# Patient Record
Sex: FEMALE | Race: WHITE | NOT HISPANIC OR LATINO | Employment: OTHER | URBAN - NONMETROPOLITAN AREA
[De-identification: names, ages, dates, MRNs, and addresses within clinical notes are randomized per-mention and may not be internally consistent; named-entity substitution may affect disease eponyms.]

---

## 2019-02-04 ENCOUNTER — HISTORICAL (OUTPATIENT)
Dept: ADMINISTRATIVE | Facility: HOSPITAL | Age: 59
End: 2019-02-04

## 2019-02-12 ENCOUNTER — HISTORICAL (OUTPATIENT)
Dept: ADMINISTRATIVE | Facility: HOSPITAL | Age: 59
End: 2019-02-12

## 2020-12-03 ENCOUNTER — HISTORICAL (OUTPATIENT)
Dept: ADMINISTRATIVE | Facility: HOSPITAL | Age: 60
End: 2020-12-03

## 2020-12-03 LAB
ALBUMIN SERPL-MCNC: 4.5 G/DL (ref 3.8–4.8)
ALBUMIN/GLOB SERPL: 1.7 {RATIO} (ref 1.2–2.2)
ALP SERPL-CCNC: 90 IU/L (ref 39–117)
ALT SERPL-CCNC: 16 IU/L (ref 0–32)
AST SERPL-CCNC: 20 IU/L (ref 0–40)
BASOPHILS # BLD AUTO: 0 X10E3/UL (ref 0–0.2)
BASOPHILS NFR BLD AUTO: 1 %
BILIRUB SERPL-MCNC: 0.6 MG/DL (ref 0–1.2)
BUN SERPL-MCNC: 15 MG/DL (ref 8–27)
CALCIUM SERPL-MCNC: 9.5 MG/DL (ref 8.7–10.3)
CHLORIDE SERPL-SCNC: 106 MMOL/L (ref 96–106)
CHOLEST SERPL-MCNC: 199 MG/DL (ref 100–199)
CHOLEST/HDLC SERPL: 4.2 RATIO (ref 0–4.4)
CO2 SERPL-SCNC: 22 MMOL/L (ref 20–29)
CREAT SERPL-MCNC: 0.83 MG/DL (ref 0.57–1)
CREAT/UREA NIT SERPL: 18 (ref 12–28)
EOSINOPHIL # BLD AUTO: 0.1 X10E3/UL (ref 0–0.4)
EOSINOPHIL NFR BLD AUTO: 2 %
ERYTHROCYTE [DISTWIDTH] IN BLOOD BY AUTOMATED COUNT: 13.2 % (ref 11.7–15.4)
GLOBULIN SER-MCNC: 2.6 G/DL (ref 1.5–4.5)
GLUCOSE SERPL-MCNC: 101 MG/DL (ref 65–99)
HCT VFR BLD AUTO: 44.9 % (ref 34–46.6)
HDLC SERPL-MCNC: 47 MG/DL
HGB BLD-MCNC: 14.3 G/DL (ref 11.1–15.9)
LDLC SERPL CALC-MCNC: 134 MG/DL (ref 0–99)
LYMPHOCYTES # BLD AUTO: 1.9 X10E3/UL (ref 0.7–3.1)
LYMPHOCYTES NFR BLD AUTO: 40 %
MCH RBC QN AUTO: 29.7 PG (ref 26.6–33)
MCHC RBC AUTO-ENTMCNC: 31.8 G/DL (ref 31.5–35.7)
MCV RBC AUTO: 93 FL (ref 79–97)
MONOCYTES # BLD AUTO: 0.4 X10E3/UL (ref 0.1–0.9)
MONOCYTES NFR BLD AUTO: 9 %
NEUTROPHILS # BLD AUTO: 2.3 X10E3/UL (ref 1.4–7)
NEUTROPHILS NFR BLD AUTO: 48 %
PLATELET # BLD AUTO: 278 X10E3/UL (ref 150–450)
POTASSIUM SERPL-SCNC: 4.1 MMOL/L (ref 3.5–5.2)
PROT SERPL-MCNC: 7.1 G/DL (ref 6–8.5)
RBC # BLD AUTO: 4.82 X10(6)/MCL (ref 3.77–5.28)
SODIUM SERPL-SCNC: 144 MMOL/L (ref 134–144)
TRIGL SERPL-MCNC: 97 MG/DL (ref 0–149)
TSH SERPL-ACNC: 0.81 MIU/ML (ref 0.45–4.5)
VLDLC SERPL CALC-MCNC: 18 MG/DL (ref 5–40)
WBC # SPEC AUTO: 4.8 X10E3/UL (ref 3.4–10.8)

## 2021-03-23 ENCOUNTER — HISTORICAL (OUTPATIENT)
Dept: ADMINISTRATIVE | Facility: HOSPITAL | Age: 61
End: 2021-03-23

## 2021-10-05 ENCOUNTER — HISTORICAL (OUTPATIENT)
Dept: ADMINISTRATIVE | Facility: HOSPITAL | Age: 61
End: 2021-10-05

## 2022-03-01 ENCOUNTER — TELEPHONE (OUTPATIENT)
Dept: GASTROENTEROLOGY | Facility: CLINIC | Age: 62
End: 2022-03-01

## 2022-03-01 RX ORDER — ESCITALOPRAM OXALATE 20 MG/1
20 TABLET ORAL DAILY
COMMUNITY
Start: 2022-02-25 | End: 2023-12-20 | Stop reason: SDUPTHER

## 2022-03-01 RX ORDER — DEXLANSOPRAZOLE 60 MG/1
1 CAPSULE, DELAYED RELEASE ORAL DAILY
COMMUNITY
Start: 2022-02-25 | End: 2023-01-30 | Stop reason: SDUPTHER

## 2022-03-01 RX ORDER — SIMVASTATIN 20 MG/1
20 TABLET, FILM COATED ORAL NIGHTLY
COMMUNITY
Start: 2022-02-25 | End: 2023-12-20 | Stop reason: SDUPTHER

## 2022-03-08 ENCOUNTER — TELEPHONE (OUTPATIENT)
Dept: GASTROENTEROLOGY | Facility: CLINIC | Age: 62
End: 2022-03-08

## 2022-03-08 NOTE — TELEPHONE ENCOUNTER
----- Message from Jennifer Duarte MA sent at 3/4/2022 11:41 AM CST -----    ----- Message -----  From: Zohreh More  Sent: 3/4/2022  11:21 AM CST  To: Andrea CHARLES Staff    Pt leaving cell number for call back - [phone]

## 2022-03-15 DIAGNOSIS — Z12.11 SCREENING FOR MALIGNANT NEOPLASM OF COLON: Primary | ICD-10-CM

## 2022-03-15 NOTE — TELEPHONE ENCOUNTER
----- Message from Elvia Ferrara sent at 3/15/2022  1:51 PM CDT -----  Patient need to speak to nurse regarding scheduling a colonoscopy. Call back number [phone]. Tks

## 2022-03-18 ENCOUNTER — TELEPHONE (OUTPATIENT)
Dept: GASTROENTEROLOGY | Facility: CLINIC | Age: 62
End: 2022-03-18

## 2022-03-18 VITALS — WEIGHT: 199 LBS | HEIGHT: 64 IN | BODY MASS INDEX: 33.97 KG/M2

## 2022-03-18 DIAGNOSIS — Z12.11 SCREENING FOR MALIGNANT NEOPLASM OF COLON: Primary | ICD-10-CM

## 2022-03-18 NOTE — TELEPHONE ENCOUNTER
"[facility]  [address] [place] [address]  Phone: [phone]  Fax: [phone]    History & Physical         Provider: Dr. Felicity Mendez    Patient Name: Gill MAYNARD (age):1960  61 y.o.           Gender: female   Phone: [phone]     Referring Physician: Rocael Garcia     Vital Signs:   5'4"  199 lb     Plan: Colonoscopy     Encounter Diagnosis   Name Primary?    Screening for malignant neoplasm of colon Yes           History:      Past Medical History:   Diagnosis Date    BMI 34.0-34.9,adult     Depression     GERD (gastroesophageal reflux disease)     Hypercholesterolemia     Renal stones       Past Surgical History:   Procedure Laterality Date    COLONOSCOPY      HYSTERECTOMY        Medication List with Changes/Refills   Current Medications    DEXLANSOPRAZOLE (DEXILANT) 60 MG CAPSULE    Take 1 capsule by mouth once daily.    ESCITALOPRAM OXALATE (LEXAPRO) 20 MG TABLET    Take 20 mg by mouth once daily.    SIMVASTATIN (ZOCOR) 20 MG TABLET    Take 20 mg by mouth nightly.      Review of patient's allergies indicates:   Allergen Reactions    Codeine       No family history on file.   Social History     Tobacco Use    Smoking status: Never Smoker    Smokeless tobacco: Never Used   Substance Use Topics    Alcohol use: Not Currently    Drug use: Never        Physical Examination:     General Appearance:___________________________  HEENT: _____________________________________  Abdomen:____________________________________  Heart:________________________________________  Lungs:_______________________________________  Extremities:___________________________________  Skin:_________________________________________  Endocrine:____________________________________  Genitourinary:_________________________________  Neurological:__________________________________      Patient has " been evaluated immediately prior to sedationa dn is medically cleared for endoscopy with IVCS as an ASA class: ______      Physician Signature: _________________________       Date: ________  Time: ________

## 2022-03-21 RX ORDER — SOD SULF/POT CHLORIDE/MAG SULF 1.479 G
12 TABLET ORAL DAILY
Qty: 24 TABLET | Refills: 0 | Status: SHIPPED | OUTPATIENT
Start: 2022-03-21 | End: 2023-08-18

## 2022-03-24 ENCOUNTER — TELEPHONE (OUTPATIENT)
Dept: GASTROENTEROLOGY | Facility: CLINIC | Age: 62
End: 2022-03-24
Payer: COMMERCIAL

## 2022-03-24 NOTE — TELEPHONE ENCOUNTER
----- Message from Naomi Palomino sent at 3/24/2022 11:06 AM CDT -----  Gill Serrano stated that her colonoscopy prep has a hold on it so the pharmacy is unable to fill it. Please give her a call back with an update regarding what's going on [phone]      [facility] [address] [facility] [address] [facility]  [address]  [place] [address]  Phone: [phone] Fax: [phone]

## 2022-04-10 ENCOUNTER — HISTORICAL (OUTPATIENT)
Dept: ADMINISTRATIVE | Facility: HOSPITAL | Age: 62
End: 2022-04-10
Payer: COMMERCIAL

## 2022-04-13 ENCOUNTER — OUTSIDE PLACE OF SERVICE (OUTPATIENT)
Dept: GASTROENTEROLOGY | Facility: CLINIC | Age: 62
End: 2022-04-13

## 2022-04-13 LAB — CRC RECOMMENDATION EXT: NORMAL

## 2022-04-13 PROCEDURE — 45385 COLONOSCOPY W/LESION REMOVAL: CPT | Mod: 33,,, | Performed by: INTERNAL MEDICINE

## 2022-04-13 PROCEDURE — 45385 PR COLONOSCOPY,REMV LESN,SNARE: ICD-10-PCS | Mod: 33,,, | Performed by: INTERNAL MEDICINE

## 2022-04-22 ENCOUNTER — TELEPHONE (OUTPATIENT)
Dept: GASTROENTEROLOGY | Facility: CLINIC | Age: 62
End: 2022-04-22

## 2022-04-22 NOTE — TELEPHONE ENCOUNTER
Results and recommendations discussed with patient, who voices understanding and agreement. They will contact us with any issues.  GUMEL, MARTIN

## 2022-04-24 VITALS
SYSTOLIC BLOOD PRESSURE: 138 MMHG | HEIGHT: 64 IN | DIASTOLIC BLOOD PRESSURE: 86 MMHG | WEIGHT: 188.94 LBS | OXYGEN SATURATION: 96 % | BODY MASS INDEX: 32.26 KG/M2

## 2022-05-03 NOTE — HISTORICAL [facility] CERNER
This is a historical note converted from Dayo. Formatting and pictures may have been removed.  Please reference Dayo for original formatting and attached multimedia. Chief Complaint  right foot bone spur pain  left ear pain  History of Present Illness  61-year-old female?presents with complaints of right?plantar foot pain near the heel.? The pain is worse first step in the morning or after prolonged sitting.? However, she constantly has pain.? She does attribute her symptoms to?wearing?crocs?frequently.? She does have a history of plantar fasciitis that responded well to an injection many years ago.? She also has a history of a heel spur?resection?many years ago?in the left foot.  ?  Additionally, she complains of some left ear pain.? She denies any otorrhea, nasal congestion, aural fullness.  Review of Systems  See HPI  Physical Exam  Vitals & Measurements  T:?36.6? ?C (Oral)? HR:?76(Peripheral)? BP:?150/94? SpO2:?98%?  HT:?166.50?cm? WT:?85.700?kg? BMI:?30.91?  General: Awake, alert, no acute distress  ENT:?Bilateral external auditory canals and tympanic membranes normal.? Mild tenderness to palpation over the left TMJ.  Right foot:?Tenderness to palpation over the plantar fascial insertion  ?  X-ray?right?calcaneus:?Moderate spurring of the calcaneus on the plantar aspect?as well as?at the Achilles  Assessment/Plan  1.?Plantar fasciitis of right foot ? M72.2  2.?Calcaneal spur, right ? M77.31  Refer to orthopedics/podiatry for injection and/or surgery (patient previously saw Dr. Kay in )  Supportive shoes and stretches  ?  3.?Otalgia, left ear ? H92.02  Suspect TMJ  Warm moist heat and NSAIDs  ?  4.?Encounter for immunization ? Z23  Flu vaccine today.   Problem List/Past Medical History  Ongoing  Gastroesophageal reflux disease  Hypercholesterolemia  Major depression, recurrent  Obesity  Renal stones  Procedure/Surgical History  Colonoscopy (2009)  foot surgery  hand surgery  Hysterectomy    Medications  Bi-est/Progesterone/DHEA/Testosterone, 4/30/15/1, 1mg/ml, See Instructions, 3 refills  Dexilant 60 mg oral delayed release capsule, 60 mg= 1 cap(s), Oral, Daily, 3 refills  escitalopram 20 mg oral tablet, 20 mg= 1 tab(s), Oral, Daily, 3 refills  simvastatin 20 mg oral tablet, 20 mg= 1 tab(s), Oral, Once a day (at bedtime), 3 refills  Allergies  codeine?(Confusion)  Social History  Abuse/Neglect  No, 10/05/2021  Alcohol  Never, 07/21/2021  Employment/School  Employed, Work/School description: [employer] 07/21/2021  Sexual  Sexually active: No. Gender Identity Identifies as female. Other contraceptive use: s/p hysteractomy. No, 07/21/2021  Substance Use  Never, 07/21/2021  Tobacco  Never (less than 100 in lifetime), N/A, 10/05/2021  Family History  Hypertension.: Unknown.  Primary malignant neoplasm of brain: Father.  Primary malignant neoplasm of breast: Other.  Primary malignant neoplasm of female genital organ: Mother.  Primary malignant neoplasm of prostate: Father.  Uterine cancer: Mother.

## 2022-06-16 ENCOUNTER — HISTORICAL (OUTPATIENT)
Dept: ADMINISTRATIVE | Facility: HOSPITAL | Age: 62
End: 2022-06-16

## 2023-01-19 PROBLEM — S43.432A GLENOID LABRAL TEAR, LEFT, INITIAL ENCOUNTER: Status: ACTIVE | Noted: 2023-01-19

## 2023-01-19 PROBLEM — M75.112 PARTIAL THICKNESS TEAR OF LEFT ROTATOR CUFF: Status: ACTIVE | Noted: 2023-01-19

## 2023-01-30 ENCOUNTER — TELEPHONE (OUTPATIENT)
Dept: FAMILY MEDICINE | Facility: CLINIC | Age: 63
End: 2023-01-30
Payer: COMMERCIAL

## 2023-01-30 DIAGNOSIS — K21.9 GASTROESOPHAGEAL REFLUX DISEASE, UNSPECIFIED WHETHER ESOPHAGITIS PRESENT: Primary | ICD-10-CM

## 2023-01-30 RX ORDER — DEXLANSOPRAZOLE 60 MG/1
1 CAPSULE, DELAYED RELEASE ORAL DAILY
Qty: 30 CAPSULE | Refills: 11 | Status: SHIPPED | OUTPATIENT
Start: 2023-01-30 | End: 2023-12-20 | Stop reason: SDUPTHER

## 2023-01-30 RX ORDER — DEXLANSOPRAZOLE 60 MG/1
1 CAPSULE, DELAYED RELEASE ORAL DAILY
Qty: 30 CAPSULE | Refills: 11 | Status: CANCELLED | OUTPATIENT
Start: 2023-01-30 | End: 2024-01-30

## 2023-01-30 NOTE — TELEPHONE ENCOUNTER
----- Message from Le Carreon MA sent at 1/30/2023 11:38 AM CST -----  Regarding: Refill  .Type:  RX Refill Request    Who Called: Gill    RX Name and Strength:Dexilant 60mg, QD     Is this a 30 day or 90 day RX:30    Preferred Pharmacy with phone number: [facility]     Best Call Back Number:[phone]

## 2023-02-01 ENCOUNTER — DOCUMENTATION ONLY (OUTPATIENT)
Dept: ADMINISTRATIVE | Facility: HOSPITAL | Age: 63
End: 2023-02-01
Payer: COMMERCIAL

## 2023-02-23 PROBLEM — S46.012A TRAUMATIC INCOMPLETE TEAR OF LEFT ROTATOR CUFF: Status: ACTIVE | Noted: 2023-02-23

## 2023-06-06 ENCOUNTER — TELEPHONE (OUTPATIENT)
Dept: OBSTETRICS AND GYNECOLOGY | Facility: CLINIC | Age: 63
End: 2023-06-06

## 2023-06-06 NOTE — TELEPHONE ENCOUNTER
----- Message from Brylee Guillory sent at 6/6/2023  4:24 PM CDT -----  Regarding: mammo order  Type:  Mammogram    Caller is requesting to schedule their annual mammogram appointment.    Order is not listed in EPIC.  Please enter orde    Name of Caller:na  Where would they like the mammogram performed?[facility]  Would the patient rather a call back or a response via [facility]? na  Best Call Back Number:na  Additional Information: na

## 2023-06-08 DIAGNOSIS — Z12.31 BREAST CANCER SCREENING BY MAMMOGRAM: Primary | ICD-10-CM

## 2023-06-19 ENCOUNTER — HOSPITAL ENCOUNTER (OUTPATIENT)
Dept: RADIOLOGY | Facility: HOSPITAL | Age: 63
Discharge: HOME | End: 2023-06-19
Attending: NURSE PRACTITIONER
Payer: COMMERCIAL

## 2023-06-19 VITALS — WEIGHT: 195 LBS | HEIGHT: 64 IN | BODY MASS INDEX: 33.29 KG/M2

## 2023-06-19 DIAGNOSIS — Z12.31 BREAST CANCER SCREENING BY MAMMOGRAM: ICD-10-CM

## 2023-06-19 PROCEDURE — 77067 SCR MAMMO BI INCL CAD: CPT | Mod: TC

## 2023-08-07 NOTE — PROGRESS NOTES
Chief Complaint: Annual exam    No chief complaint on file.      HPI:   62 y.o. F No obstetric history on file. presents for an annual gyn exam.        FmHx: negative for breast, uterine, ovarian, and colon cancers.     Labs / Significant Studies:       No family history on file.      Past Medical History:   Diagnosis Date    BMI 34.0-34.9,adult     Depression     GERD (gastroesophageal reflux disease)     Hypercholesterolemia     Personal history of colonic polyps     Renal stones      Past Surgical History:   Procedure Laterality Date    COLONOSCOPY  2009    COLONOSCOPY W/ BIOPSIES AND POLYPECTOMY  04/13/2022    HYSTERECTOMY         Current Outpatient Medications:     dexlansoprazole (DEXILANT) 60 mg capsule, Take 1 capsule (60 mg total) by mouth once daily., Disp: 30 capsule, Rfl: 11    EScitalopram oxalate (LEXAPRO) 20 MG tablet, Take 20 mg by mouth once daily., Disp: , Rfl:     simvastatin (ZOCOR) 20 MG tablet, Take 20 mg by mouth nightly., Disp: , Rfl:     sod sulf-pot chloride-mag sulf (SUTAB) 1.479-0.188- 0.225 gram tablet, Take 12 tablets by mouth once daily. Take according to package instructions with indicated amount of water. No breakfast day before test., Disp: 24 tablet, Rfl: 0    Review of patient's allergies indicates:   Allergen Reactions    Codeine        Social History     Tobacco Use    Smoking status: Never    Smokeless tobacco: Never   Substance Use Topics    Alcohol use: Not Currently    Drug use: Never       Review of Systems:  General/Constitutional: Chills denies. Fatigue/weakness denies. Fever denies. Night sweats denies. Hot flashes denies    Respiratory: Cough denies. Hemoptysis denies. SOB denies. Sputum production denies. Wheezing denies .   Cardiovascular: Chest pain denies . Dizziness denies. Palpitations denies. Swelling in hands/feet denies    Gastrointestinal: Abdominal pain denies. Blood in stool denies. Constipation denies. Diarrhea denies. Heartburn denies. Nausea denies.  Vomiting denies    Genitourinary: Incontinence denies. Blood in urine denies. Frequent urination denies. Painful urination denies. Urinary urgency denies. Nocturia denies    Gynecologic: Irregular menses denies. Heavy bleeding denies. Painful menses denies. Vaginal discharge denies. Vaginal odor denies. Vaginal itching denies. Vaginal lesion denies. Pelvic pain denies. Decreased libido denies. Vulvar lesion denies. Prolapse of genital organs denies. Painful intercourse denies. Postcoital bleeding denies    Psychiatric: Depression denies. Anxiety denies     Physical Exam:   There were no vitals filed for this visit.    There is no height or weight on file to calculate BMI.       Chaperone: present.     General appearance: healthy, well-nourished and well-developed     Psychiatric: Orientation to time, place and person. Normal mood and affect and active, alert     Skin: Appearance: no rashes or lesions.     Neck:   Neck: supple, FROM, trachea midline. and no masses   Thyroid: no enlargement or nodules and non-tender.       Cardiovascular:   Auscultation: RRR and no murmur.   Peripheral Vascular: no varicosities, LLE edema, RLE edema, calf tenderness, and palpable cord and pedal pulses intact.     Lungs:   Respiratory effort: no intercostal retractions or accessory muscle usage.   Auscultation: no wheezing, rales/crackles, or rhonchi and clear to auscultation.     Breast:   Inspection/Palpation: no tenderness, discrete/distinct masses, skin changes, or abnormal secretions. Nipple appearance normal.     Abdomen:   Auscultation/Inspection/Palpation: no hepatomegaly, splenomegaly, masses, tenderness or CVA tenderness and soft, non-distended bowel sounds preset.    Hernia: no palpable hernias.     Female Genitalia:    Vulva: no masses, tenderness or lesions    Bladder/Urethra: no urethral discharge or mass, normal meatus, bladder non-distended.    Vagina: no tenderness, erythema, cystocele, rectocele, abnormal vaginal  discharge or vesicle(s) or ulcers    Cervix: no discharge, no cervical lacerations noted or motion tenderness and grossly normal    Uterus: normal size and shape and midline, non-tender, and no uterine prolapse.    Adnexa/Parametria: no parametrial tenderness or mass, no adnexal tenderness or ovarian mass.     Lymph Nodes:   Palpation: non tender submandibular nodes, axillary nodes, or inguinal nodes.     Rectal Exam:   Rectum: normal perianal skin.       Assessment:     Patient Active Problem List   Diagnosis    Superior glenoid labrum lesion of left shoulder    Partial thickness tear of left rotator cuff    Traumatic incomplete tear of left rotator cuff       Health Maintenance Due   Topic Date Due    Hepatitis C Screening  Never done    COVID-19 Vaccine (1) Never done    HIV Screening  Never done    TETANUS VACCINE  Never done    Shingles Vaccine (1 of 2) Never done     Health Maintenance Topics with due status: Not Due       Topic Last Completion Date    Influenza Vaccine 10/05/2021    Colorectal Cancer Screening 04/13/2022    Hemoglobin A1c (Diabetic Prevention Screening) 12/15/2022    Lipid Panel 12/15/2022    Mammogram 06/19/2023         Plan:    There are no diagnoses linked to this encounter.

## 2023-08-18 ENCOUNTER — OFFICE VISIT (OUTPATIENT)
Dept: OBSTETRICS AND GYNECOLOGY | Facility: CLINIC | Age: 63
End: 2023-08-18
Payer: COMMERCIAL

## 2023-08-18 VITALS
SYSTOLIC BLOOD PRESSURE: 138 MMHG | DIASTOLIC BLOOD PRESSURE: 78 MMHG | BODY MASS INDEX: 32.06 KG/M2 | WEIGHT: 187.81 LBS | HEIGHT: 64 IN

## 2023-08-18 DIAGNOSIS — Z79.890 HORMONE REPLACEMENT THERAPY: ICD-10-CM

## 2023-08-18 DIAGNOSIS — Z01.419 NORMAL GYNECOLOGIC EXAMINATION: Primary | ICD-10-CM

## 2023-08-18 DIAGNOSIS — Z80.49 FAMILY HISTORY OF UTERINE CANCER: ICD-10-CM

## 2023-08-18 PROCEDURE — 1160F PR REVIEW ALL MEDS BY PRESCRIBER/CLIN PHARMACIST DOCUMENTED: ICD-10-PCS | Mod: CPTII,,, | Performed by: NURSE PRACTITIONER

## 2023-08-18 PROCEDURE — 3008F BODY MASS INDEX DOCD: CPT | Mod: CPTII,,, | Performed by: NURSE PRACTITIONER

## 2023-08-18 PROCEDURE — 99396 PR PREVENTIVE VISIT,EST,40-64: ICD-10-PCS | Mod: ,,, | Performed by: NURSE PRACTITIONER

## 2023-08-18 PROCEDURE — 3078F DIAST BP <80 MM HG: CPT | Mod: CPTII,,, | Performed by: NURSE PRACTITIONER

## 2023-08-18 PROCEDURE — 1159F PR MEDICATION LIST DOCUMENTED IN MEDICAL RECORD: ICD-10-PCS | Mod: CPTII,,, | Performed by: NURSE PRACTITIONER

## 2023-08-18 PROCEDURE — 1159F MED LIST DOCD IN RCRD: CPT | Mod: CPTII,,, | Performed by: NURSE PRACTITIONER

## 2023-08-18 PROCEDURE — 3008F PR BODY MASS INDEX (BMI) DOCUMENTED: ICD-10-PCS | Mod: CPTII,,, | Performed by: NURSE PRACTITIONER

## 2023-08-18 PROCEDURE — 3075F SYST BP GE 130 - 139MM HG: CPT | Mod: CPTII,,, | Performed by: NURSE PRACTITIONER

## 2023-08-18 PROCEDURE — 3078F PR MOST RECENT DIASTOLIC BLOOD PRESSURE < 80 MM HG: ICD-10-PCS | Mod: CPTII,,, | Performed by: NURSE PRACTITIONER

## 2023-08-18 PROCEDURE — 3075F PR MOST RECENT SYSTOLIC BLOOD PRESS GE 130-139MM HG: ICD-10-PCS | Mod: CPTII,,, | Performed by: NURSE PRACTITIONER

## 2023-08-18 PROCEDURE — 1160F RVW MEDS BY RX/DR IN RCRD: CPT | Mod: CPTII,,, | Performed by: NURSE PRACTITIONER

## 2023-08-18 PROCEDURE — 99396 PREV VISIT EST AGE 40-64: CPT | Mod: ,,, | Performed by: NURSE PRACTITIONER

## 2023-08-18 NOTE — PROGRESS NOTES
Chief Complaint:   Annual Exam     History of Present Illness:  Gill Serrano is a 63 y.o. year old  here for her annual exam status post JESSIE BSO. Denies any vaginal bleeding. Denies any health changes. Receives hormone pellets with Dr. Ewing, has upcoming follow up appt with [facility]. Content.     +family history of uterine cancer in mother at age 83. Negative for other gyn or colon cancers.      MENOPAUSAL:  Age at menarche: 15  Age at menopause: unsure   Hysterectomy:  Yes  Last pap: unsure  H/o abnl pap prior to current: no  Postcoital Bleeding: No  Sexually Active: no   Dyspareunia: No  H/o STI: No   HRT: Hormone Pellets @ Dr. Ewing   MM23, Benign   H/o abnl MMG: Hx of Dense Breast   Colonoscopy: 22, +tubular adenoma/hyperplastic polyp         Review of Systems:  General/Constitutional: Chills denies. Fatigue/weakness denies. Fever denies. Night sweats denies. Hot flashes denies    Respiratory: Cough denies. Hemoptysis denies. SOB denies. Sputum production denies. Wheezing denies .   Cardiovascular: Chest pain denies . Dizziness denies. Palpitations denies. Swelling in hands/feet denies    Gastrointestinal: Abdominal pain denies. Blood in stool denies. Constipation denies. Diarrhea denies. Heartburn denies. Nausea denies. Vomiting denies    Genitourinary: Incontinence denies. Blood in urine denies. Frequent urination denies. Painful urination denies. Urinary urgency denies. Nocturia denies    Gynecologic: Irregular menses denies. Heavy bleeding denies. Painful menses denies. Vaginal discharge denies. Vaginal odor denies. Vaginal itching denies. Vaginal lesion denies. Pelvic pain denies. Decreased libido denies. Vulvar lesion denies. Prolapse of genital organs denies. Painful intercourse denies. Postcoital bleeding denies    Psychiatric: Depression denies. Anxiety denies     OB History    Para Term  AB Living   2 2 2 0 0 2   SAB IAB Ectopic Multiple Live Births   0 0 0 0 2       # 1 - Date: [birth year]/25/86, Sex: Female, Weight: 3.544 kg (7 lb 13 oz), GA: 40w0d, Delivery: Vaginal, Spontaneous, Apgar1: None, Apgar5: None, Living: Living, Birth Comments: None    # 2 - Date: 07/21/90, Sex: Male, Weight: 3.345 kg (7 lb 6 oz), GA: None, Delivery: Vaginal, Spontaneous, Apgar1: None, Apgar5: None, Living: Living, Birth Comments: None      Past Medical History:   Diagnosis Date    BMI 34.0-34.9,adult     Depression     GERD (gastroesophageal reflux disease)     Hypercholesterolemia     Personal history of colonic polyps     Renal stones        Current Outpatient Medications:     dexlansoprazole (DEXILANT) 60 mg capsule, Take 1 capsule (60 mg total) by mouth once daily., Disp: 30 capsule, Rfl: 11    EScitalopram oxalate (LEXAPRO) 20 MG tablet, Take 20 mg by mouth once daily., Disp: , Rfl:     medical supply, miscellaneous (MISCELLANEOUS CREAMS TOP), Apply 1 mg topically. Bi-Est/Progesterone/DHEA/Testosterone, Disp: , Rfl:     medical supply, miscellaneous (MISCELLANEOUS MEDICAL SUPPLY MISC), by Misc.(Non-Drug; Combo Route) route. Hormone pellets Given By Dr. Ewing, Disp: , Rfl:     simvastatin (ZOCOR) 20 MG tablet, Take 20 mg by mouth nightly., Disp: , Rfl:     Review of patient's allergies indicates:   Allergen Reactions    Codeine      Past Surgical History:   Procedure Laterality Date    COLONOSCOPY  2009    COLONOSCOPY W/ BIOPSIES AND POLYPECTOMY  04/13/2022    FOOT SURGERY Left     Bone spur    HAND TENDON SURGERY Right     Trigger finger    TOTAL ABDOMINAL HYSTERECTOMY W/ BILATERAL SALPINGOOPHORECTOMY  1993     Family History   Problem Relation Age of Onset    Uterine cancer Mother     Breast cancer Neg Hx     Cervical cancer Neg Hx     Ovarian cancer Neg Hx     Colon cancer Neg Hx      Social History     Socioeconomic History    Marital status:     Number of children: 2   Occupational History    Occupation: retired   Tobacco Use    Smoking status: Never    Smokeless tobacco: Never  "  Substance and Sexual Activity    Alcohol use: Yes     Comment: social    Drug use: Never    Sexual activity: Not Currently     Partners: Male     Birth control/protection: See Surgical Hx   Social History Narrative    ** Merged History Encounter **            Physical Exam:  /78   Ht 5' 4" (1.626 m)   Wt 85.2 kg (187 lb 13.3 oz)   BMI 32.24 kg/m²     Chaperone: present.       General appearance: healthy, well-nourished and well-developed     Psychiatric: Orientation to time, place and person. Normal mood and affect and active, alert     Skin: Appearance: no rashes or lesions.     Neck:   Neck: supple, FROM, trachea midline. and no masses   Thyroid: no enlargement or nodules and non-tender.       Cardiovascular:   Auscultation: RRR and no murmur.   Peripheral Vascular: no varicosities, LLE edema, RLE edema, calf tenderness, and palpable cord and pedal pulses intact.     Lungs:   Respiratory effort: no intercostal retractions or accessory muscle usage.   Auscultation: no wheezing, rales/crackles, or rhonchi and clear to auscultation.     Breast:   Inspection/Palpation: no tenderness, discrete/distinct masses, skin changes, or abnormal secretions. Nipple appearance normal.     Abdomen:   Auscultation/Inspection/Palpation: no hepatomegaly, splenomegaly, masses, tenderness or CVA tenderness and soft, non-distended bowel sounds preset.    Hernia: no palpable hernias.     Female Genitalia:    Vulva: no masses, tenderness or lesions    Bladder/Urethra: no urethral discharge or mass, normal meatus, bladder non-distended.    Vagina: no tenderness, erythema, cystocele, rectocele, abnormal vaginal discharge or vesicle(s) or ulcers    Cuff intact, no masses, NT   Adnexa/Parametria: no parametrial tenderness or mass, no adnexal tenderness or ovarian mass.     Lymph Nodes:   Palpation: non tender submandibular nodes, axillary nodes, or inguinal nodes.     Rectal Exam:   Rectum: normal perianal skin. "       Assessment/Plan:  1. Normal gynecologic examination  No pap, s/p hyst, no h/o abnl pap   Advised patient if she notices any changes to her breasts, including a lump, mass, dimpling, discharge, rash or tenderness, to should contact us immediately   Healthy diet, exercise   MMG  Multivitamin   Seat belt   Sunscreen use   Safe sex/ STI education   Annual labs: w/ PCP, Dr Garcia  C-scope: 4/13/22, +tubular adenoma/hyperplastic polyp    2. Hormone replacement therapy  Educated  Discontinued the vaginal cream  Advised to follow up with [facility] for the hormone pellets    3. Family history of uterine cancer

## 2023-12-14 LAB
ALBUMIN SERPL-MCNC: 4.4 G/DL (ref 3.9–4.9)
ALBUMIN/GLOB SERPL: 1.8 {RATIO} (ref 1.2–2.2)
ALP SERPL-CCNC: 99 IU/L (ref 44–121)
ALT SERPL-CCNC: 11 IU/L (ref 0–32)
AST SERPL-CCNC: 16 IU/L (ref 0–40)
BASOPHILS # BLD AUTO: 0 X10E3/UL (ref 0–0.2)
BASOPHILS NFR BLD AUTO: 1 %
BILIRUB SERPL-MCNC: 0.4 MG/DL (ref 0–1.2)
BUN SERPL-MCNC: 11 MG/DL (ref 8–27)
BUN/CREAT SERPL: 13 (ref 12–28)
CALCIUM SERPL-MCNC: 9.4 MG/DL (ref 8.7–10.3)
CHLORIDE SERPL-SCNC: 105 MMOL/L (ref 96–106)
CHOLEST SERPL-MCNC: 159 MG/DL (ref 100–199)
CO2 SERPL-SCNC: 24 MMOL/L (ref 20–29)
CREAT SERPL-MCNC: 0.86 MG/DL (ref 0.57–1)
EOSINOPHIL # BLD AUTO: 0.1 X10E3/UL (ref 0–0.4)
EOSINOPHIL NFR BLD AUTO: 2 %
ERYTHROCYTE [DISTWIDTH] IN BLOOD BY AUTOMATED COUNT: 13.7 % (ref 11.7–15.4)
EST. GFR  (NO RACE VARIABLE): 75 ML/MIN/1.73
GLOBULIN SER CALC-MCNC: 2.4 G/DL (ref 1.5–4.5)
GLUCOSE SERPL-MCNC: 99 MG/DL (ref 70–99)
HBA1C MFR BLD: 5.5 % (ref 4.8–5.6)
HCT VFR BLD AUTO: 45.7 % (ref 34–46.6)
HDLC SERPL-MCNC: 38 MG/DL
HGB BLD-MCNC: 15 G/DL (ref 11.1–15.9)
IMM GRANULOCYTES NFR BLD AUTO: 0 %
LDLC SERPL CALC-MCNC: 97 MG/DL (ref 0–99)
LYMPHOCYTES # BLD AUTO: 2.7 X10E3/UL (ref 0.7–3.1)
LYMPHOCYTES NFR BLD AUTO: 50 %
MCH RBC QN AUTO: 29.6 PG (ref 26.6–33)
MCHC RBC AUTO-ENTMCNC: 32.8 G/DL (ref 31.5–35.7)
MCV RBC AUTO: 90 FL (ref 79–97)
MONOCYTES # BLD AUTO: 0.4 X10E3/UL (ref 0.1–0.9)
MONOCYTES NFR BLD AUTO: 8 %
NEUTROPHILS # BLD AUTO: 2.1 X10E3/UL (ref 1.4–7)
NEUTROPHILS NFR BLD AUTO: 39 %
PLATELET # BLD AUTO: 269 X10E3/UL (ref 150–450)
POTASSIUM SERPL-SCNC: 4.1 MMOL/L (ref 3.5–5.2)
PROT SERPL-MCNC: 6.8 G/DL (ref 6–8.5)
RBC # BLD AUTO: 5.06 X10E6/UL (ref 3.77–5.28)
SODIUM SERPL-SCNC: 145 MMOL/L (ref 134–144)
TRIGL SERPL-MCNC: 134 MG/DL (ref 0–149)
TSH SERPL DL<=0.005 MIU/L-ACNC: 1.52 UIU/ML (ref 0.45–4.5)
VLDLC SERPL CALC-MCNC: 24 MG/DL (ref 5–40)
WBC # BLD AUTO: 5.3 X10E3/UL (ref 3.4–10.8)

## 2023-12-20 ENCOUNTER — OFFICE VISIT (OUTPATIENT)
Dept: FAMILY MEDICINE | Facility: CLINIC | Age: 63
End: 2023-12-20
Payer: COMMERCIAL

## 2023-12-20 VITALS
DIASTOLIC BLOOD PRESSURE: 76 MMHG | BODY MASS INDEX: 32.27 KG/M2 | HEART RATE: 70 BPM | SYSTOLIC BLOOD PRESSURE: 122 MMHG | HEIGHT: 64 IN | TEMPERATURE: 97 F | WEIGHT: 189 LBS | OXYGEN SATURATION: 96 %

## 2023-12-20 DIAGNOSIS — F33.42 RECURRENT MAJOR DEPRESSIVE DISORDER, IN FULL REMISSION: ICD-10-CM

## 2023-12-20 DIAGNOSIS — Z00.01 ENCOUNTER FOR WELL ADULT EXAM WITH ABNORMAL FINDINGS: Primary | ICD-10-CM

## 2023-12-20 DIAGNOSIS — E78.00 HYPERCHOLESTEROLEMIA: ICD-10-CM

## 2023-12-20 DIAGNOSIS — K21.9 GERD WITHOUT ESOPHAGITIS: ICD-10-CM

## 2023-12-20 PROBLEM — N20.0 RENAL STONES: Status: ACTIVE | Noted: 2023-12-20

## 2023-12-20 PROBLEM — Z86.0100 PERSONAL HISTORY OF COLONIC POLYPS: Status: ACTIVE | Noted: 2023-12-20

## 2023-12-20 PROBLEM — Z86.010 PERSONAL HISTORY OF COLONIC POLYPS: Status: ACTIVE | Noted: 2023-12-20

## 2023-12-20 PROCEDURE — 1160F PR REVIEW ALL MEDS BY PRESCRIBER/CLIN PHARMACIST DOCUMENTED: ICD-10-PCS | Mod: CPTII,,, | Performed by: FAMILY MEDICINE

## 2023-12-20 PROCEDURE — 99396 PR PREVENTIVE VISIT,EST,40-64: ICD-10-PCS | Mod: ,,, | Performed by: FAMILY MEDICINE

## 2023-12-20 PROCEDURE — 1159F MED LIST DOCD IN RCRD: CPT | Mod: CPTII,,, | Performed by: FAMILY MEDICINE

## 2023-12-20 PROCEDURE — 3078F DIAST BP <80 MM HG: CPT | Mod: CPTII,,, | Performed by: FAMILY MEDICINE

## 2023-12-20 PROCEDURE — 1160F RVW MEDS BY RX/DR IN RCRD: CPT | Mod: CPTII,,, | Performed by: FAMILY MEDICINE

## 2023-12-20 PROCEDURE — 3078F PR MOST RECENT DIASTOLIC BLOOD PRESSURE < 80 MM HG: ICD-10-PCS | Mod: CPTII,,, | Performed by: FAMILY MEDICINE

## 2023-12-20 PROCEDURE — 3008F BODY MASS INDEX DOCD: CPT | Mod: CPTII,,, | Performed by: FAMILY MEDICINE

## 2023-12-20 PROCEDURE — 1159F PR MEDICATION LIST DOCUMENTED IN MEDICAL RECORD: ICD-10-PCS | Mod: CPTII,,, | Performed by: FAMILY MEDICINE

## 2023-12-20 PROCEDURE — 99396 PREV VISIT EST AGE 40-64: CPT | Mod: ,,, | Performed by: FAMILY MEDICINE

## 2023-12-20 PROCEDURE — 3008F PR BODY MASS INDEX (BMI) DOCUMENTED: ICD-10-PCS | Mod: CPTII,,, | Performed by: FAMILY MEDICINE

## 2023-12-20 PROCEDURE — 3044F PR MOST RECENT HEMOGLOBIN A1C LEVEL <7.0%: ICD-10-PCS | Mod: CPTII,,, | Performed by: FAMILY MEDICINE

## 2023-12-20 PROCEDURE — 3044F HG A1C LEVEL LT 7.0%: CPT | Mod: CPTII,,, | Performed by: FAMILY MEDICINE

## 2023-12-20 PROCEDURE — 3074F PR MOST RECENT SYSTOLIC BLOOD PRESSURE < 130 MM HG: ICD-10-PCS | Mod: CPTII,,, | Performed by: FAMILY MEDICINE

## 2023-12-20 PROCEDURE — 3074F SYST BP LT 130 MM HG: CPT | Mod: CPTII,,, | Performed by: FAMILY MEDICINE

## 2023-12-20 RX ORDER — ESCITALOPRAM OXALATE 20 MG/1
20 TABLET ORAL DAILY
Qty: 90 TABLET | Refills: 3 | Status: SHIPPED | OUTPATIENT
Start: 2023-12-20 | End: 2024-12-19

## 2023-12-20 RX ORDER — SIMVASTATIN 20 MG/1
20 TABLET, FILM COATED ORAL NIGHTLY
Qty: 90 TABLET | Refills: 3 | Status: SHIPPED | OUTPATIENT
Start: 2023-12-20 | End: 2024-12-19

## 2023-12-20 RX ORDER — DEXLANSOPRAZOLE 60 MG/1
1 CAPSULE, DELAYED RELEASE ORAL DAILY
Qty: 30 CAPSULE | Refills: 11 | Status: SHIPPED | OUTPATIENT
Start: 2023-12-20 | End: 2024-12-19

## 2023-12-20 NOTE — PROGRESS NOTES
"SUBJECTIVE:  HPI    Gill Serrano is a 63 y.o. female here for Annual Exam (Follow up -labs).     She is doing very well.  She has no current complaints or concerns.  She remains compliant with her medications.  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      Kellys allergies, medications, history, and problem list were updated as appropriate.    ROS:  Pertinent ROS as above, otherwise negative 12 point review of systems    OBJECTIVE:  Vital signs  Visit Vitals  /76 (BP Location: Right arm, Patient Position: Sitting)   Pulse 70   Temp 97.2 °F (36.2 °C) (Temporal)   Ht 5' 3.78" (1.62 m)   Wt 85.7 kg (189 lb)   SpO2 96%   BMI 32.67 kg/m²          PHYSICAL EXAM:  General: Awake, alert, no acute distress  Neck:  No bruits, no masses  Cardiovascular:  Regular rhythm.  Normal rate.  No murmurs.  Respiratory: Clear to auscultation bilaterally, normal effort  Extremities:  No cyanosis, no clubbing, no edema  Skin:  No rashes or appreciable lesions   Neuro:  Cranial nerves 2-12 are intact with usual testing.  Gait is normal.  Moves all 4 extremities equally and symmetrically.  Psychiatric:  Normal mood and affect.    Chemistry:  Lab Results   Component Value Date     (H) 12/12/2023    K 4.1 12/12/2023    BUN 11 12/12/2023    CREATININE 0.86 12/12/2023    EGFRNORACEVR 75 12/12/2023    GLUCOSE 101 (H) 12/03/2020    CALCIUM 9.4 12/12/2023    ALKPHOS 90 12/03/2020    AST 16 12/12/2023    ALT 11 12/12/2023    TSH 1.520 12/12/2023        Lab Results   Component Value Date    HGBA1C 5.5 12/12/2023        Hematology:  Lab Results   Component Value Date    WBC 5.3 12/12/2023    HGB 15.0 12/12/2023    MCV 90 12/12/2023     12/12/2023       Lipid Panel:  Lab Results   Component Value Date    CHOL 159 12/12/2023    HDL 38 (L) 12/12/2023    TRIG 134 12/12/2023    TOTALCHOLEST 4.2 12/03/2020        ASSESSMENT/PLAN:  1. Encounter for well adult exam with abnormal findings  Assessment & " Plan:  Counseled on continued healthy lifestyle choices, regular exercise    Orders:  -     Lipid Panel; Future; Expected date: 12/20/2024  -     CBC Auto Differential; Future; Expected date: 12/20/2024  -     Comprehensive Metabolic Panel; Future; Expected date: 12/20/2024  -     TSH; Future; Expected date: 12/20/2024    2. GERD without esophagitis  -     dexlansoprazole (DEXILANT) 60 mg capsule; Take 1 capsule (60 mg total) by mouth once daily.  Dispense: 30 capsule; Refill: 11    3. Hypercholesterolemia  -     Lipid Panel; Future; Expected date: 12/20/2024  -     simvastatin (ZOCOR) 20 MG tablet; Take 1 tablet (20 mg total) by mouth nightly.  Dispense: 90 tablet; Refill: 3    4. Recurrent major depressive disorder, in full remission  -     EScitalopram oxalate (LEXAPRO) 20 MG tablet; Take 1 tablet (20 mg total) by mouth once daily.  Dispense: 90 tablet; Refill: 3        Follow Up:  Follow up in about 1 year (around 12/20/2024) for Fasting labs, welcome to Medicare wellness.

## 2024-03-25 PROBLEM — Z00.01 ENCOUNTER FOR WELL ADULT EXAM WITH ABNORMAL FINDINGS: Status: RESOLVED | Noted: 2023-12-20 | Resolved: 2024-03-25

## 2024-06-13 DIAGNOSIS — Z12.31 SCREENING MAMMOGRAM FOR BREAST CANCER: Primary | ICD-10-CM

## 2024-06-20 ENCOUNTER — HOSPITAL ENCOUNTER (OUTPATIENT)
Dept: RADIOLOGY | Facility: HOSPITAL | Age: 64
Discharge: HOME | End: 2024-06-20
Attending: NURSE PRACTITIONER
Payer: COMMERCIAL

## 2024-06-20 DIAGNOSIS — Z12.31 SCREENING MAMMOGRAM FOR BREAST CANCER: ICD-10-CM

## 2024-06-20 PROCEDURE — 77067 SCR MAMMO BI INCL CAD: CPT | Mod: TC

## 2024-08-14 ENCOUNTER — TELEPHONE (OUTPATIENT)
Dept: FAMILY MEDICINE | Facility: CLINIC | Age: 64
End: 2024-08-14
Payer: COMMERCIAL

## 2024-08-14 DIAGNOSIS — J01.80 ACUTE NON-RECURRENT SINUSITIS OF OTHER SINUS: Primary | ICD-10-CM

## 2024-08-14 RX ORDER — CEFDINIR 300 MG/1
300 CAPSULE ORAL 2 TIMES DAILY
Qty: 20 CAPSULE | Refills: 0 | Status: SHIPPED | OUTPATIENT
Start: 2024-08-14 | End: 2024-08-24

## 2024-08-28 ENCOUNTER — OFFICE VISIT (OUTPATIENT)
Dept: OBSTETRICS AND GYNECOLOGY | Facility: CLINIC | Age: 64
End: 2024-08-28
Payer: COMMERCIAL

## 2024-08-28 VITALS
BODY MASS INDEX: 30.54 KG/M2 | SYSTOLIC BLOOD PRESSURE: 142 MMHG | TEMPERATURE: 97 F | DIASTOLIC BLOOD PRESSURE: 84 MMHG | WEIGHT: 172.38 LBS | HEIGHT: 63 IN

## 2024-08-28 DIAGNOSIS — Z01.411 ABNORMAL GYNECOLOGICAL EXAMINATION: Primary | ICD-10-CM

## 2024-08-28 DIAGNOSIS — N95.2 ATROPHIC VAGINITIS: ICD-10-CM

## 2024-08-28 PROCEDURE — G0101 CA SCREEN;PELVIC/BREAST EXAM: HCPCS | Mod: ,,, | Performed by: NURSE PRACTITIONER

## 2024-08-28 NOTE — PROGRESS NOTES
Chief Complaint: Annual exam    Chief Complaint   Patient presents with    Well Woman       HPI:   64 y.o. F  presents for an annual gyn exam.    S/p JESSIE, BSO with no complaints today.    PMH GERD, depression, hypercholesterolemia        Labs / Significant Studies:  Gyn History:    Menstrual History  Cycle: No  Menarche Age: 15 years  No Cycle Reason: (!) Surgical  Surgical Reason: hysterectomy    Menopause  Menopause Age: 1 years  Post Menopausal Bleeding: No  Hormone Replacement Therapy: Yes (Pellets)    Pap History  Last pap date:  (unknown)  History of Abnormal Pap: No  HPV Vaccine Completed: No (0/3)    [place]  Sexually Active: No  STI History: No  Contraception: No (s/p hyst)    Breast History  Last Breast Imaging Date: Yes  Date: 24 (benign)  History of Abnormal Breast Imaging : (!) Yes  Date: 19 (US,  benign)  History of Breast Biopsy: No    Family History   Problem Relation Name Age of Onset    Uterine cancer Mother @83, Diagnosed     Breast cancer Neg Hx      Cervical cancer Neg Hx      Ovarian cancer Neg Hx      Colon cancer Neg Hx           Past Medical History:   Diagnosis Date    GERD without esophagitis 2023    Hypercholesterolemia     Personal history of colonic polyps     Recurrent major depressive disorder, in full remission 2023    Renal stones      Past Surgical History:   Procedure Laterality Date    COLONOSCOPY      COLONOSCOPY W/ BIOPSIES AND POLYPECTOMY  2022    FOOT SURGERY Left     Bone spur    HAND TENDON SURGERY Right     Trigger finger    HYSTERECTOMY      TOTAL ABDOMINAL HYSTERECTOMY W/ BILATERAL SALPINGOOPHORECTOMY         Current Outpatient Medications:     dexlansoprazole (DEXILANT) 60 mg capsule, Take 1 capsule (60 mg total) by mouth once daily., Disp: 30 capsule, Rfl: 11    EScitalopram oxalate (LEXAPRO) 20 MG tablet, Take 1 tablet (20 mg total) by mouth once daily., Disp: 90 tablet, Rfl: 3    medical supply, miscellaneous  "([employer] MEDICAL SUPPLY MISC), by Misc.(Non-Drug; Combo Route) route. Hormone pellets Given By Dr. Ewing, Disp: , Rfl:     simvastatin (ZOCOR) 20 MG tablet, Take 1 tablet (20 mg total) by mouth nightly., Disp: 90 tablet, Rfl: 3    Review of patient's allergies indicates:   Allergen Reactions    Codeine        Social History     Tobacco Use    Smoking status: Never     Passive exposure: Never    Smokeless tobacco: Never   Substance Use Topics    Alcohol use: Yes     Comment: social    Drug use: Never       Review of Systems:  General/Constitutional: Chills denies. Fatigue/weakness denies. Fever denies. Night sweats denies. Hot flashes denies    Respiratory: Cough denies. Hemoptysis denies. SOB denies. Sputum production denies. Wheezing denies .   Cardiovascular: Chest pain denies . Dizziness denies. Palpitations denies. Swelling in hands/feet denies    Gastrointestinal: Abdominal pain denies. Blood in stool denies. Constipation denies. Diarrhea denies. Heartburn denies. Nausea denies. Vomiting denies    Genitourinary: Incontinence denies. Blood in urine denies. Frequent urination denies. Painful urination denies. Urinary urgency denies. Nocturia denies    Gynecologic: Irregular menses denies. Heavy bleeding denies. Painful menses denies. Vaginal discharge denies. Vaginal odor denies. Vaginal itching denies. Vaginal lesion denies. Pelvic pain denies. Decreased libido denies. Vulvar lesion denies. Prolapse of genital organs denies. Painful intercourse denies. Postcoital bleeding denies    Psychiatric: Depression denies. Anxiety denies     Physical Exam:   Vitals:    08/28/24 1411 08/28/24 1413   BP: (!) 140/88 (!) 142/84   BP Location: Left arm Left arm   Patient Position: Sitting Sitting   BP Method: Medium (Manual) Medium (Manual)   Temp: 97.3 °F (36.3 °C)    TempSrc: Temporal    Weight: 78.2 kg (172 lb 6.4 oz)    Height: 5' 3" (1.6 m)        Body mass index is 30.54 kg/m².       Chaperone: present.     General " appearance: healthy, well-nourished and well-developed     Psychiatric: Orientation to time, place and person. Normal mood and affect and active, alert     Skin: Appearance: no rashes or lesions.     Neck:   Neck: supple, FROM, trachea midline. and no masses   Thyroid: no enlargement or nodules and non-tender.       Cardiovascular:   Auscultation: RRR and no murmur.   Peripheral Vascular: no varicosities, LLE edema, RLE edema, calf tenderness, and palpable cord and pedal pulses intact.     Lungs:   Respiratory effort: no intercostal retractions or accessory muscle usage.   Auscultation: no wheezing, rales/crackles, or rhonchi and clear to auscultation.     Breast:   Inspection/Palpation: no tenderness, discrete/distinct masses, skin changes, or abnormal secretions. Nipple appearance normal.     Abdomen:   Auscultation/Inspection/Palpation: no hepatomegaly, splenomegaly, masses, tenderness or CVA tenderness and soft, non-distended bowel sounds preset.    Hernia: no palpable hernias.     Female Genitalia:    Vulva: no masses, tenderness or lesions    Bladder/Urethra: no urethral discharge or mass, normal meatus, bladder non-distended.    Vagina: no tenderness, erythema, cystocele, rectocele, abnormal vaginal discharge or vesicle(s) or ulcers, cuff intact, atrophy noted       Lymph Nodes:   Palpation: non tender submandibular nodes, axillary nodes, or inguinal nodes.     Rectal Exam:   Rectum: normal perianal skin.       Assessment:     Patient Active Problem List   Diagnosis    Superior glenoid labrum lesion of left shoulder    Partial thickness tear of left rotator cuff    Traumatic incomplete tear of left rotator cuff    GERD without esophagitis    Hypercholesterolemia    Recurrent major depressive disorder, in full remission    Renal stones    Personal history of colonic polyps       Health Maintenance Due   Topic Date Due    Hepatitis C Screening  Never done    HIV Screening  Never done    TETANUS VACCINE  Never  done    DEXA Scan  Never done    Shingles Vaccine (1 of 2) Never done    RSV Vaccine (Age 60+ and Pregnant patients) (1 - 1-dose 60+ series) Never done    COVID-19 Vaccine (1 - 2023-24 season) Never done    Pneumococcal Vaccines (Age 65+) (1 of 1 - PCV) Never done     Health Maintenance Topics with due status: Not Due       Topic Last Completion Date    Influenza Vaccine 10/05/2021    Colorectal Cancer Screening 04/13/2022    Hemoglobin A1c (Diabetic Prevention Screening) 12/12/2023    Lipid Panel 12/12/2023    High Dose Statin 12/20/2023    Mammogram 06/21/2024         Plan:    Gill was seen today for well woman.    Diagnoses and all orders for this visit:    Abnormal gynecological examination  Reviewed calcium needs, exercise, and prevention of osteoporosis.  Healthy diet  Annual MMG  Discussed breast self-awareness  Colonoscopy q 10 yrs  Reviewed normal menopause and menopausal symptoms  Strongly encouraged Shingles vaccination  RTC 1 yr   Atrophic vaginitis  - Educated     - Lubricants, coconut oil, baby oil

## 2024-09-09 ENCOUNTER — OFFICE VISIT (OUTPATIENT)
Dept: FAMILY MEDICINE | Facility: CLINIC | Age: 64
End: 2024-09-09
Payer: MEDICARE

## 2024-09-09 VITALS
OXYGEN SATURATION: 94 % | HEART RATE: 79 BPM | TEMPERATURE: 98 F | SYSTOLIC BLOOD PRESSURE: 138 MMHG | HEIGHT: 63 IN | BODY MASS INDEX: 30.48 KG/M2 | DIASTOLIC BLOOD PRESSURE: 86 MMHG | WEIGHT: 172 LBS

## 2024-09-09 DIAGNOSIS — I10 BENIGN ESSENTIAL HYPERTENSION: Primary | ICD-10-CM

## 2024-09-09 PROCEDURE — 93010 ELECTROCARDIOGRAM REPORT: CPT | Mod: ,,, | Performed by: FAMILY MEDICINE

## 2024-09-09 PROCEDURE — 99214 OFFICE O/P EST MOD 30 MIN: CPT | Mod: ,,, | Performed by: FAMILY MEDICINE

## 2024-09-09 RX ORDER — VALSARTAN AND HYDROCHLOROTHIAZIDE 80; 12.5 MG/1; MG/1
1 TABLET, FILM COATED ORAL DAILY
Qty: 90 TABLET | Refills: 3 | Status: SHIPPED | OUTPATIENT
Start: 2024-09-09 | End: 2025-09-09

## 2024-09-09 NOTE — PROGRESS NOTES
"SUBJECTIVE:  HPI    Gill Serrano is a 64 y.o. female here for BP.  She reports multiple blood pressure elevations over the last several weeks.  She went to an urgent care clinic with a sinus infection, she had her annual gynecology wellness exam, she went to the dentist.  At every appointment, her blood pressures were elevated so she began checking her blood pressures regularly at home with a home blood pressure cuff.  Her systolic blood pressures have consistently been greater than 150 and diastolic blood pressures have consistently been greater than 85.    She has had some occasional palpitations.  No syncope.  No angina.  No blurry vision or double vision.  She does admit to an episode of presyncope when she was trying to help her  get up from the floor after a fall.  It sounds like this occurred after a Valsalva maneuver.    She has a strong family history of hypertension with her mother and all of her sisters having a history of hypertension.    Kellys allergies, medications, history, and problem list were updated as appropriate.    ROS:  Pertinent ROS as above, otherwise negative    OBJECTIVE:  Vital signs  Visit Vitals  /86 (BP Location: Right arm, Patient Position: Sitting, BP Method: Medium (Manual))   Pulse 79   Temp 97.8 °F (36.6 °C) (Temporal)   Ht 5' 2.99" (1.6 m)   Wt 78 kg (172 lb)   SpO2 (!) 94%   BMI 30.48 kg/m²          PHYSICAL EXAM:  General: Awake, alert, no acute distress  Neck:  No bruits, no masses  Cardiovascular:  Regular rhythm.  Normal rate.  No murmurs.  Respiratory: Clear to auscultation bilaterally, normal effort  Extremities:  No cyanosis, no clubbing, no edema  Skin:  No rashes or appreciable lesions   Neuro:  Cranial nerves 2-12 are intact with usual testing.  Gait is normal.  Moves all 4 extremities equally and symmetrically.  Psychiatric:  Normal mood and affect.    EKG, my interpretation:  Normal sinus rhythm, normal intervals.  Nonspecific T-wave changes.  " Normal EKG.    Reviewed labs from December 2023      ASSESSMENT/PLAN:  1. Benign essential hypertension  Assessment & Plan:  Start valsartan hydrochlorothiazide 80/12.5 mg daily     Continue home blood pressure checks     Regular exercise     Return to clinic in 3 weeks, check BMP at visit    Orders:  -     POCT EKG 12-LEAD (NOT FOR [facility] USE)  -     valsartan-hydrochlorothiazide (DIOVAN-HCT) 80-12.5 mg per tablet; Take 1 tablet by mouth once daily.  Dispense: 90 tablet; Refill: 3

## 2024-09-09 NOTE — ASSESSMENT & PLAN NOTE
Start valsartan hydrochlorothiazide 80/12.5 mg daily     Continue home blood pressure checks     Regular exercise     Return to clinic in 3 weeks, check BMP at visit

## 2024-09-30 ENCOUNTER — TELEPHONE (OUTPATIENT)
Dept: FAMILY MEDICINE | Facility: CLINIC | Age: 64
End: 2024-09-30

## 2024-09-30 ENCOUNTER — OFFICE VISIT (OUTPATIENT)
Dept: FAMILY MEDICINE | Facility: CLINIC | Age: 64
End: 2024-09-30
Payer: MEDICARE

## 2024-09-30 VITALS
WEIGHT: 174 LBS | OXYGEN SATURATION: 96 % | HEART RATE: 66 BPM | DIASTOLIC BLOOD PRESSURE: 84 MMHG | TEMPERATURE: 98 F | BODY MASS INDEX: 30.83 KG/M2 | HEIGHT: 63 IN | SYSTOLIC BLOOD PRESSURE: 122 MMHG

## 2024-09-30 DIAGNOSIS — I10 BENIGN ESSENTIAL HYPERTENSION: Primary | ICD-10-CM

## 2024-09-30 LAB
ANION GAP SERPL CALC-SCNC: 6 MEQ/L (ref 2–13)
BUN SERPL-MCNC: 15 MG/DL (ref 7–20)
CALCIUM SERPL-MCNC: 10 MG/DL (ref 8.4–10.2)
CHLORIDE SERPL-SCNC: 106 MMOL/L (ref 98–110)
CO2 SERPL-SCNC: 30 MMOL/L (ref 21–32)
CREAT SERPL-MCNC: 0.92 MG/DL (ref 0.66–1.25)
CREAT/UREA NIT SERPL: 16 (ref 12–20)
GFR SERPLBLD CREATININE-BSD FMLA CKD-EPI: 69 ML/MIN/1.73/M2
GLUCOSE SERPL-MCNC: 102 MG/DL (ref 70–115)
POTASSIUM SERPL-SCNC: 3.5 MMOL/L (ref 3.5–5.1)
SODIUM SERPL-SCNC: 142 MMOL/L (ref 136–145)

## 2024-09-30 PROCEDURE — 80048 BASIC METABOLIC PNL TOTAL CA: CPT | Performed by: FAMILY MEDICINE

## 2024-09-30 PROCEDURE — 99213 OFFICE O/P EST LOW 20 MIN: CPT | Mod: ,,, | Performed by: FAMILY MEDICINE

## 2024-09-30 NOTE — ASSESSMENT & PLAN NOTE
Continue valsartan hydrochlorothiazide 80/12.5 mg daily     Check a basic metabolic panel today and call with results     Otherwise, return to clinic as scheduled with labs in December

## 2024-09-30 NOTE — PROGRESS NOTES
"SUBJECTIVE:  HPI    Gill Serrano is a 64 y.o. female here for 3 wk f/u on hypertension.  She is tolerating valsartan hydrochlorothiazide without any significant side effects.  No orthostatic dizziness.  No chest pain.  No headaches.  Her home blood pressures have been normal with systolic blood pressures less than 120 and diastolic blood pressures less than 80      Kellys allergies, medications, history, and problem list were updated as appropriate.    ROS:  Pertinent ROS as above, otherwise negative    OBJECTIVE:  Vital signs  Visit Vitals  /84 (BP Location: Left arm, Patient Position: Sitting)   Pulse 66   Temp 97.7 °F (36.5 °C) (Temporal)   Ht 5' 2.99" (1.6 m)   Wt 78.9 kg (174 lb)   SpO2 96%   BMI 30.83 kg/m²          PHYSICAL EXAM:  General: Awake, alert, no acute distress      ASSESSMENT/PLAN:  1. Benign essential hypertension  Assessment & Plan:  Continue valsartan hydrochlorothiazide 80/12.5 mg daily     Check a basic metabolic panel today and call with results     Otherwise, return to clinic as scheduled with labs in December    Orders:  -     Basic Metabolic Panel; Future; Expected date: 09/30/2024      "

## 2025-01-03 DIAGNOSIS — K21.9 GERD WITHOUT ESOPHAGITIS: ICD-10-CM

## 2025-01-03 RX ORDER — DEXLANSOPRAZOLE 60 MG/1
1 CAPSULE, DELAYED RELEASE ORAL DAILY
Qty: 30 CAPSULE | Refills: 3 | Status: SHIPPED | OUTPATIENT
Start: 2025-01-03

## 2025-01-03 RX ORDER — DEXLANSOPRAZOLE 60 MG/1
1 CAPSULE, DELAYED RELEASE ORAL
Qty: 30 CAPSULE | Refills: 11 | Status: CANCELLED | OUTPATIENT
Start: 2025-01-03

## 2025-01-27 PROCEDURE — 80053 COMPREHEN METABOLIC PANEL: CPT | Performed by: FAMILY MEDICINE

## 2025-01-27 PROCEDURE — 85025 COMPLETE CBC W/AUTO DIFF WBC: CPT | Performed by: FAMILY MEDICINE

## 2025-01-27 PROCEDURE — 80061 LIPID PANEL: CPT | Performed by: FAMILY MEDICINE

## 2025-01-27 PROCEDURE — 84443 ASSAY THYROID STIM HORMONE: CPT | Performed by: FAMILY MEDICINE

## 2025-01-30 DIAGNOSIS — E78.00 HYPERCHOLESTEROLEMIA: ICD-10-CM

## 2025-01-30 DIAGNOSIS — F33.42 RECURRENT MAJOR DEPRESSIVE DISORDER, IN FULL REMISSION: ICD-10-CM

## 2025-01-30 RX ORDER — SIMVASTATIN 20 MG/1
20 TABLET, FILM COATED ORAL NIGHTLY
Qty: 90 TABLET | Refills: 3 | Status: SHIPPED | OUTPATIENT
Start: 2025-01-30

## 2025-01-30 RX ORDER — ESCITALOPRAM OXALATE 20 MG/1
20 TABLET ORAL
Qty: 90 TABLET | Refills: 3 | Status: SHIPPED | OUTPATIENT
Start: 2025-01-30

## 2025-02-17 ENCOUNTER — OFFICE VISIT (OUTPATIENT)
Dept: FAMILY MEDICINE | Facility: CLINIC | Age: 65
End: 2025-02-17
Payer: MEDICARE

## 2025-02-17 VITALS
DIASTOLIC BLOOD PRESSURE: 68 MMHG | OXYGEN SATURATION: 98 % | HEIGHT: 63 IN | BODY MASS INDEX: 30.83 KG/M2 | TEMPERATURE: 97 F | HEART RATE: 65 BPM | SYSTOLIC BLOOD PRESSURE: 100 MMHG

## 2025-02-17 DIAGNOSIS — F33.42 RECURRENT MAJOR DEPRESSIVE DISORDER, IN FULL REMISSION: ICD-10-CM

## 2025-02-17 DIAGNOSIS — Z00.01 ENCOUNTER FOR WELL ADULT EXAM WITH ABNORMAL FINDINGS: Primary | ICD-10-CM

## 2025-02-17 DIAGNOSIS — K21.9 GERD WITHOUT ESOPHAGITIS: ICD-10-CM

## 2025-02-17 DIAGNOSIS — Z23 ENCOUNTER FOR IMMUNIZATION: ICD-10-CM

## 2025-02-17 DIAGNOSIS — I10 BENIGN ESSENTIAL HYPERTENSION: ICD-10-CM

## 2025-02-17 DIAGNOSIS — E78.00 HYPERCHOLESTEROLEMIA: ICD-10-CM

## 2025-02-17 RX ORDER — ROSUVASTATIN CALCIUM 10 MG/1
10 TABLET, COATED ORAL DAILY
Qty: 90 TABLET | Refills: 3 | Status: SHIPPED | OUTPATIENT
Start: 2025-02-17 | End: 2026-02-17

## 2025-02-17 NOTE — PROGRESS NOTES
Internal Medicine      Patient ID: [id number]     Chief Complaint: Medicare Annual Wellness     HPI:     Gill Serrano is a 64 y.o. female here today for a Medicare Annual Wellness visit and comprehensive Health Risk Assessment.     A separate E/M code has been provided to evaluate additional complaints that the patient would like addressed during the dedicated Medicare Wellness Exam.    No current problems, questions, concerns.  Her  has stage IV renal cell cancer.  Patient denies any chest pain, shortness on breath, abdominal pain.  She denies any depressed mood.    Health Maintenance   Topic Date Due    Hepatitis C Screening  Never done    HIV Screening  Never done    DEXA Scan  Never done    Pneumococcal Vaccines (Age 50+) (1 of 1 - PCV) Never done    RSV Vaccine (Age 60+ and Pregnant patients) (1 - Risk 60-74 years 1-dose series) Never done    Colorectal Cancer Screening  04/13/2025    Mammogram  06/21/2025    Hemoglobin A1c (Diabetic Prevention Screening)  12/12/2026    Lipid Panel  01/27/2030    TETANUS VACCINE  05/25/2032    Shingles Vaccine  Completed    Influenza Vaccine  Completed    COVID-19 Vaccine  Completed        Past Medical History:   Diagnosis Date    Benign essential hypertension 09/09/2024    GERD without esophagitis 12/20/2023    Hypercholesterolemia     Personal history of colonic polyps     Recurrent major depressive disorder, in full remission 12/20/2023    Renal stones         Past Surgical History:   Procedure Laterality Date    COLONOSCOPY  2009    COLONOSCOPY W/ BIOPSIES AND POLYPECTOMY  04/13/2022    FOOT SURGERY Left     Bone spur    HAND TENDON SURGERY Right     Trigger finger    HYSTERECTOMY  1996    TOTAL ABDOMINAL HYSTERECTOMY W/ BILATERAL SALPINGOOPHORECTOMY  1993        Social History     Socioeconomic History    Marital status:     Number of children: 2   Occupational History    Occupation: retired   Tobacco Use    Smoking status: Never     Passive exposure:  Never    Smokeless tobacco: Never   Substance and Sexual Activity    Alcohol use: Yes     Comment: social    Drug use: Never    Sexual activity: Not Currently     Partners: Male     Birth control/protection: See Surgical Hx   Social History Narrative    ** Merged History Encounter **             Family History   Problem Relation Name Age of Onset    Uterine cancer Mother @83, Diagnosed     Hypertension Mother @83, Diagnosed     Cancer Mother @83, Diagnosed     Cancer Father Marty Wynn     Hypertension Sister          Multiple sisters with hypertension    Breast cancer Neg Hx      Cervical cancer Neg Hx      Ovarian cancer Neg Hx      Colon cancer Neg Hx          Current Outpatient Medications   Medication Instructions    dexlansoprazole (DEXILANT) 60 mg, Oral, Daily    EScitalopram oxalate (LEXAPRO) 20 mg, Oral    medical supply, miscellaneous (MISCELLANEOUS MEDICAL SUPPLY MISC) by Misc.(Non-Drug; Combo Route) route. Hormone pellets Given By Dr. Ewing    rosuvastatin (CRESTOR) 10 mg, Oral, Daily    valsartan-hydrochlorothiazide (DIOVAN-HCT) 80-12.5 mg per tablet 1 tablet, Oral, Daily       Review of patient's allergies indicates:   Allergen Reactions    Codeine         Immunization History   Administered Date(s) Administered    COVID-19, MRNA, LN-S, PF (Pfizer) (Gray Cap) 05/25/2022    COVID-19, MRNA, LN-S, PF (Pfizer) (Purple Cap) 03/11/2021, 04/01/2021, 11/29/2021    COVID-19, mRNA, LNP-S, PF (Moderna) Ages 12+ 11/14/2023, 09/24/2024    Influenza - Quadrivalent - PF *Preferred* (6 months and older) 09/28/2016, 09/13/2017, 11/07/2018, 09/05/2019, 09/08/2020, 10/05/2021, 11/14/2023    Influenza - Trivalent - Afluria, Fluzone MDV 12/08/2005    Influenza - Trivalent - Fluad - Adjuvanted - PF (65 years and older 09/24/2024    Influenza - Trivalent - Fluarix, Flulaval, Fluzone, Afluria - PF 10/07/2015    Td (ADULT) 08/05/2004    Tdap 11/17/2018, 05/25/2022    Zoster Recombinant 09/05/2019, 11/10/2019        Patient  "Care Team:  Rocael Garcia MD as PCP - General (Family Medicine)  Meg Ewing MD as Obstetrician (Obstetrics and Gynecology)    Subjective:     Review of Systems    12 point review of systems conducted, negative except as stated in the history of present illness. See HPI for details.    Objective:     Visit Vitals  /68 (BP Location: Left arm, Patient Position: Sitting)   Pulse 65   Temp 96.8 °F (36 °C) (Temporal)   Ht 5' 2.99" (1.6 m)   SpO2 98%   BMI 30.83 kg/m²       Physical Exam  Vitals and nursing note reviewed.   Constitutional:       Appearance: Normal appearance.   HENT:      Head: Normocephalic and atraumatic.   Eyes:      Conjunctiva/sclera: Conjunctivae normal.      Pupils: Pupils are equal, round, and reactive to light.   Cardiovascular:      Rate and Rhythm: Normal rate and regular rhythm.      Heart sounds: Normal heart sounds.   Pulmonary:      Effort: Pulmonary effort is normal.      Breath sounds: Normal breath sounds.   Abdominal:      General: Abdomen is flat. Bowel sounds are normal.      Palpations: Abdomen is soft.   Musculoskeletal:      Cervical back: Normal range of motion.   Skin:     General: Skin is warm and dry.      Capillary Refill: Capillary refill takes less than 2 seconds.   Neurological:      General: No focal deficit present.      Mental Status: She is alert and oriented to person, place, and time.   Psychiatric:         Mood and Affect: Mood normal.         Thought Content: Thought content normal.         Judgment: Judgment normal.       EKG today, my interpretation:  Normal sinus rhythm, normal intervals and complexes with nonspecific T-wave changes especially in lead III and lead V1    Assessment:       ICD-10-CM ICD-9-CM   1. Encounter for well adult exam with abnormal findings  Z00.01 V70.0   2. Benign essential hypertension  I10 401.1   3. Hypercholesterolemia  E78.00 272.0   4. Recurrent major depressive disorder, in full remission  F33.42 296.36   5. " GERD without esophagitis  K21.9 530.81   6. Encounter for immunization  Z23 V03.89        Plan:     1. Encounter for well adult exam with abnormal findings    2. Benign essential hypertension  Assessment & Plan:  Continue valsartan hydrochlorothiazide 80/12.5 mg daily       Orders:  -     Cancel: Comprehensive Metabolic Panel; Future; Expected date: 08/17/2025  -     Cancel: CBC Auto Differential; Future; Expected date: 08/17/2025  -     Cancel: TSH; Future; Expected date: 08/17/2025  -     CBC Auto Differential; Future; Expected date: 02/17/2026  -     Comprehensive Metabolic Panel; Future; Expected date: 02/17/2026  -     TSH; Future; Expected date: 02/17/2026    3. Hypercholesterolemia  Overview:  Lab Results   Component Value Date    CHOL 197 01/27/2025    HDL 45 01/27/2025    LDLDIRECT 115.5 (H) 01/27/2025    TRIG 156 01/27/2025    TOTALCHOLEST 4.2 12/03/2020          Assessment & Plan:  Goal LDL less than 100    Change simvastatin to rosuvastatin 10 mg daily    Orders:  -     Cancel: Lipid Panel; Future; Expected date: 08/17/2025  -     Lipid Panel; Future; Expected date: 02/17/2026  -     rosuvastatin (CRESTOR) 10 MG tablet; Take 1 tablet (10 mg total) by mouth once daily.  Dispense: 90 tablet; Refill: 3    4. Recurrent major depressive disorder, in full remission  Assessment & Plan:  Controlled with Lexapro 20 mg daily      5. GERD without esophagitis  Assessment & Plan:  Controlled Dexilant 60 mg daily      6. Encounter for immunization  -     pneumoc 20-sandra conj-dip cr(PF) (PREVNAR-20 (PF)) injection Syrg 0.5 mL    Colorectal cancer screening due after April    The following assessments were completed and reviewed. See completed screening forms and assessments within the Encounter Summary.   [x] Health Risk Assessment   [x] CVD Risk Factors   [x] Obesity/Physical Activity -  Encouraged daily 30 minute physical activity x 5 days per week.   [x] Home Safety/Living Situation   [x] Alcohol Screen  [x]  Depression (PHQ) Screen   [x] Timed Get Up and Go   [x] Whisper Test  [x] Cognitive Function/Impairment Screen   [x] Nutrition Screening  [x] ADL Screen   [x] Opioid Screen:  [x] Patient does not have a prescription for opioids.   [] Patient has a prescription for opioids but is at low risk for abuse.   [x] Substance Abuse Screen:   [x] Patient does not use substances.   [] Patient screens positive for substance use disorder.   Advance Care Planning     Date: 02/17/2025  I discussed advanced care planning with the patient including how to pick a person who would make decisions for them if they were unable to make a decision for themselves, called a health care power of .  In addition, we discussed the types of decisions a patient may need to make such as use of life-sustaining treatments, including but not limited to the use of ventilators, artificial feeding tubes, CPR when faced with a life-limiting illness and how these should be recorded on a living will.     I have provided a 5 Wishes packet to the patient for completion.           Provided patient with a 5-10 year written screening schedule and personal prevention plan. Recommendations were developed using the USPSTF age appropriate recommendations. Education, counseling, and referrals were provided as needed. After Visit Summary printed and given to patient, which includes a list of additional screenings\tests needed.    Follow up in about 1 year (around 2/17/2026) for Medicare wellness, chronic health conditions, Fasting labs. In addition to their scheduled follow up, the patient has also been instructed to follow up on as needed basis.     No future appointments.     Rocael Garcia MD

## 2025-04-21 ENCOUNTER — TELEPHONE (OUTPATIENT)
Dept: GASTROENTEROLOGY | Facility: CLINIC | Age: 65
End: 2025-04-21
Payer: MEDICARE

## 2025-04-21 NOTE — TELEPHONE ENCOUNTER
----- Message from Izzy sent at 4/21/2025  4:27 PM CDT -----  Contact: QUINTIN OLIVER [[id number]]  ...Type:  Patient Requesting Appointment Who Called: QUINTIN OLIVER [[id number]]Symptoms?: colonoscopy When they would like to be seen?  Would the patient rather a call back or a response via [facility]?  callBest Call Back Number: [phone] Information:

## 2025-04-30 ENCOUNTER — OFFICE VISIT (OUTPATIENT)
Dept: FAMILY MEDICINE | Facility: CLINIC | Age: 65
End: 2025-04-30
Payer: MEDICARE

## 2025-04-30 VITALS
HEART RATE: 78 BPM | DIASTOLIC BLOOD PRESSURE: 60 MMHG | WEIGHT: 178.19 LBS | SYSTOLIC BLOOD PRESSURE: 112 MMHG | HEIGHT: 63 IN | OXYGEN SATURATION: 98 % | TEMPERATURE: 98 F | BODY MASS INDEX: 31.57 KG/M2

## 2025-04-30 DIAGNOSIS — J01.00 ACUTE NON-RECURRENT MAXILLARY SINUSITIS: ICD-10-CM

## 2025-04-30 DIAGNOSIS — J30.89 NON-SEASONAL ALLERGIC RHINITIS DUE TO OTHER ALLERGIC TRIGGER: Primary | ICD-10-CM

## 2025-04-30 PROCEDURE — 96372 THER/PROPH/DIAG INJ SC/IM: CPT | Mod: ,,, | Performed by: FAMILY MEDICINE

## 2025-04-30 PROCEDURE — 99214 OFFICE O/P EST MOD 30 MIN: CPT | Mod: ,,, | Performed by: FAMILY MEDICINE

## 2025-04-30 RX ORDER — CEFDINIR 300 MG/1
300 CAPSULE ORAL 2 TIMES DAILY
Qty: 20 CAPSULE | Refills: 0 | Status: SHIPPED | OUTPATIENT
Start: 2025-04-30 | End: 2025-05-10

## 2025-04-30 RX ORDER — BETAMETHASONE SODIUM PHOSPHATE AND BETAMETHASONE ACETATE 3; 3 MG/ML; MG/ML
9 INJECTION, SUSPENSION INTRA-ARTICULAR; INTRALESIONAL; INTRAMUSCULAR; SOFT TISSUE
Status: COMPLETED | OUTPATIENT
Start: 2025-04-30 | End: 2025-04-30

## 2025-04-30 RX ORDER — SPIRONOLACTONE 50 MG/1
50 TABLET, FILM COATED ORAL DAILY
COMMUNITY
Start: 2025-02-21

## 2025-04-30 RX ADMIN — BETAMETHASONE SODIUM PHOSPHATE AND BETAMETHASONE ACETATE 9 MG: 3; 3 INJECTION, SUSPENSION INTRA-ARTICULAR; INTRALESIONAL; INTRAMUSCULAR; SOFT TISSUE at 01:04

## 2025-04-30 NOTE — PROGRESS NOTES
"SUBJECTIVE:  HPI    Gill Serrano is a 64 y.o. female here for Sinus Problem (Sinus infection, sore throat, dark mucus, ear pain. X2days. No enerergy.).   History of Present Illness    CHIEF COMPLAINT:  Patient presents today with sinus symptoms.    HISTORY OF PRESENT ILLNESS:  She reports sinus symptoms that started gradually after cleaning her house without a mask on Good Friday. The symptoms worsened two days ago. She experiences left-sided congestion with associated ear pain, green mucus in the mornings when clearing her throat, and episodes of chills followed by sweating suggesting fever. She initially experienced dizziness when putting her head down. She also reports recent onset of allergy symptoms including itchy nose and watery eyes. She denies cough.    MEDICATIONS:  She reports using Jaycee-Drummonds Plus day and night formulations with partial symptomatic relief.            Kellys allergies, medications, history, and problem list were updated as appropriate.    ROS:  Pertinent ROS as above, otherwise negative    OBJECTIVE:  Vital signs  Visit Vitals  /60 (BP Location: Right arm, Patient Position: Sitting)   Pulse 78   Temp 97.7 °F (36.5 °C) (Temporal)   Ht 5' 2.99" (1.6 m)   Wt 80.8 kg (178 lb 3.2 oz)   SpO2 98%   BMI 31.58 kg/m²          PHYSICAL EXAM:  General:  Awake, alert, no acute distress   Eyes:  Pupils equal, round, reactive to light.  Conjunctiva normal bilaterally.  Ears:  Bilateral external auditory canals normal.  Bilateral tympanic membranes slightly dull.  Nares:  Bilateral turbinate erythema and edema with clear rhinorrhea, worse on the left.  Left maxillary sinus tenderness.  Oropharynx:  Postnasal drainage present.  No erythema or exudate.  Cardiovascular: Regular rate and rhythm.  No murmurs.  Respiratory: Clear to auscultation bilaterally, normal effort  Skin: No rashes      ASSESSMENT/PLAN:  1. Non-seasonal allergic rhinitis due to other allergic trigger  -     betamethasone " acetate-betamethasone sodium phosphate injection 9 mg    2. Acute non-recurrent maxillary sinusitis  -     cefdinir (OMNICEF) 300 MG capsule; Take 1 capsule (300 mg total) by mouth 2 (two) times daily. for 10 days  Dispense: 20 capsule; Refill: 0      Call or return to clinic for symptoms that worsen or persist      This note was generated with the assistance of ambient listening technology. Verbal consent was obtained by the patient and accompanying visitor(s) for the recording of patient appointment to facilitate this note. I attest to having reviewed and edited the generated note for accuracy, though some syntax or spelling errors may persist. Please contact the author of this note for any clarification.

## 2025-05-19 DIAGNOSIS — K21.9 GERD WITHOUT ESOPHAGITIS: ICD-10-CM

## 2025-05-19 RX ORDER — DEXLANSOPRAZOLE 60 MG/1
1 CAPSULE, DELAYED RELEASE ORAL
Qty: 30 CAPSULE | Refills: 3 | Status: SHIPPED | OUTPATIENT
Start: 2025-05-19

## 2025-05-20 ENCOUNTER — OFFICE VISIT (OUTPATIENT)
Dept: OBSTETRICS AND GYNECOLOGY | Facility: CLINIC | Age: 65
End: 2025-05-20
Payer: MEDICARE

## 2025-05-20 VITALS
DIASTOLIC BLOOD PRESSURE: 74 MMHG | WEIGHT: 176.81 LBS | HEIGHT: 63 IN | BODY MASS INDEX: 31.33 KG/M2 | SYSTOLIC BLOOD PRESSURE: 120 MMHG

## 2025-05-20 DIAGNOSIS — Z79.890 HORMONE REPLACEMENT THERAPY (HRT): ICD-10-CM

## 2025-05-20 DIAGNOSIS — N95.1 MENOPAUSAL SYMPTOMS: Primary | ICD-10-CM

## 2025-05-20 RX ORDER — ESTRADIOL 1.53 MG/1
1 SPRAY TRANSDERMAL DAILY
Qty: 8.1 ML | Refills: 11 | Status: SHIPPED | OUTPATIENT
Start: 2025-05-20 | End: 2026-05-20

## 2025-05-20 RX ORDER — PROGESTERONE 200 MG/1
200 CAPSULE ORAL NIGHTLY
Qty: 12 CAPSULE | Refills: 11 | Status: SHIPPED | OUTPATIENT
Start: 2025-05-20 | End: 2026-05-20

## 2025-05-20 NOTE — PROGRESS NOTES
Chief Complaint:     Chief Complaint   Patient presents with    Discuss Hormones      Discuss restarting hormone cream, S/P pellets. Last pellet 2025         HPI:   64 y.o.  female   presents requesting to restart bio identical HRT.reports hormone pellets effective but getting expensive. Has used Biest/progesterone/DHEA/Testosterone in past with good results. Reports having hot flashes and night sweats daily, was unrelieved with pellets.    Gyn History:    Menstrual History  Cycle: No  Menarche Age: 15 years  No Cycle Reason: (!) Surgical  Surgical Reason: hysterectomy ([facility])    Menopause  Menopause Age: 1 years  Post Menopausal Bleeding: No  Hormone Replacement Therapy: No    Pap History  HPV Vaccine Completed: No (0/3)    [place]  Sexually Active: No  STI History: No  Contraception: No    Breast History  Last Breast Imaging Date: Yes  Date: 24 (Benign)  History of Abnormal Breast Imaging : (!) Yes  Date: 19 (U/S Benign)  History of Breast Biopsy: No    Current Medications[1]    Review of patient's allergies indicates:   Allergen Reactions    Codeine        Social History[2]    Review of Systems:   General/Constitutional: Chills denies. Fatigue/weakness denies. Fever denies. Night sweats admits. Hot flashes admits  Respiratory: Cough denies. Hemoptysis denies. SOB denies. Sputum production denies. Wheezing denies .   Cardiovascular: Chest pain denies . Dizziness denies. Palpitations denies. Swelling in hands/feet denies    Gastrointestinal: Abdominal pain denies. Blood in stool denies. Constipation denies. Diarrhea denies. Heartburn denies. Nausea denies. Vomiting denies    Genitourinary: Incontinence denies. Blood in urine denies. Frequent urination denies. Painful urination denies. Urinary urgency denies. Nocturia denies    Gynecologic: Irregular menses denies. Heavy bleeding denies. Painful menses denies. Vaginal discharge denies. Vaginal odor denies. Vaginal itching denies. Vaginal  "lesion denies. Pelvic pain denies. Decreased libido denies. Vulvar lesion denies. Prolapse of genital organs denies. Painful intercourse denies. Postcoital bleeding denies    Psychiatric: Depression denies. Anxiety denies       Physical Exam:   Vitals:    05/20/25 1047   BP: 120/74   Weight: 80.2 kg (176 lb 12.8 oz)   Height: 5' 2.99" (1.6 m)       Body mass index is 31.33 kg/m².    Physical Exam  Exam conducted with a chaperone present.   Constitutional:       Appearance: She is well-developed.   Abdominal:      General: Abdomen is flat.   Neurological:      Mental Status: She is alert and oriented to person, place, and time.   Psychiatric:         Attention and Perception: Attention normal.         Mood and Affect: Mood is not anxious or depressed.             Assessment:     Problem List[3]    Health Maintenance Due   Topic Date Due    Hepatitis C Screening  Never done    HIV Screening  Never done    DEXA Scan  Never done    RSV Vaccine (Age 60+ and Pregnant patients) (1 - Risk 60-74 years 1-dose series) Never done    Colorectal Cancer Screening  04/13/2025    Mammogram  06/21/2025     Health Maintenance Topics with due status: Not Due       Topic Last Completion Date    TETANUS VACCINE 05/25/2022    Hemoglobin A1c (Diabetic Prevention Screening) 12/12/2023    Lipid Panel 01/27/2025    High Dose Statin 04/30/2025           Plan:    Gill was seen today for discuss hormones .    Diagnoses and all orders for this visit:    Menopausal symptoms  Hormone replacement therapy (HRT)  Evamist 1 spray daily  Prometrium 200 mg q hs  DHEA OTC  RTC to discuss testosterone with Dr Chavez  - Reviewed the physiologic roles of estrogen, progesterone and androgens in the female both positive and negative.     - Explained that with menopause comes a dramatic reduction in these hormones and that changes take place in their absence.     - Discussed symptoms of decreased hormone levels and that these symptoms usually last 6 months to " 2 years.     - Reviewed hormone replacement options as well as indications and contraindications.     - Discussed the WHI study findings and current FDA recommendations. Also discussed current recommendations for breast screening.     - Reviewed precautions when taking female hormones and symptoms to be aware of such as headache, visual change, focal weakness or numbness, SOB,chest pain, pain in thigh or calf, breast pain or lump, and vaginal bleeding. Instructed to call immediately and stop HRT if any of these symptoms occur.     -Advised patient of increased risk of stroke, heart attack, and blood clots.                [1]   Current Outpatient Medications:     dexlansoprazole (DEXILANT) 60 mg capsule, TAKE ONE CAPSULE BY MOUTH EVERY DAY, Disp: 30 capsule, Rfl: 3    EScitalopram oxalate (LEXAPRO) 20 MG tablet, TAKE 1 TABLET BY MOUTH ONCE DAILY, Disp: 90 tablet, Rfl: 3    rosuvastatin (CRESTOR) 10 MG tablet, Take 1 tablet (10 mg total) by mouth once daily., Disp: 90 tablet, Rfl: 3    valsartan-hydrochlorothiazide (DIOVAN-HCT) 80-12.5 mg per tablet, Take 1 tablet by mouth once daily., Disp: 90 tablet, Rfl: 3  [2]   Social History  Tobacco Use    Smoking status: Never     Passive exposure: Never    Smokeless tobacco: Never   Substance Use Topics    Alcohol use: Yes     Comment: social    Drug use: Never   [3]   Patient Active Problem List  Diagnosis    Superior glenoid labrum lesion of left shoulder    Partial thickness tear of left rotator cuff    Traumatic incomplete tear of left rotator cuff    GERD without esophagitis    Hypercholesterolemia    Recurrent major depressive disorder, in full remission    Renal stones    Personal history of colonic polyps    Benign essential hypertension

## 2025-06-04 ENCOUNTER — OFFICE VISIT (OUTPATIENT)
Dept: OBSTETRICS AND GYNECOLOGY | Facility: CLINIC | Age: 65
End: 2025-06-04
Payer: MEDICARE

## 2025-06-04 VITALS — BODY MASS INDEX: 32.34 KG/M2 | HEIGHT: 62 IN | SYSTOLIC BLOOD PRESSURE: 122 MMHG | DIASTOLIC BLOOD PRESSURE: 84 MMHG

## 2025-06-04 DIAGNOSIS — Z79.890 HORMONE REPLACEMENT THERAPY (HRT): ICD-10-CM

## 2025-06-04 DIAGNOSIS — R23.2 HOT FLASHES: Primary | ICD-10-CM

## 2025-06-04 PROCEDURE — 99213 OFFICE O/P EST LOW 20 MIN: CPT | Mod: ,,, | Performed by: OBSTETRICS & GYNECOLOGY

## 2025-06-19 ENCOUNTER — OFFICE VISIT (OUTPATIENT)
Dept: FAMILY MEDICINE | Facility: CLINIC | Age: 65
End: 2025-06-19
Payer: MEDICARE

## 2025-06-19 ENCOUNTER — PATIENT MESSAGE (OUTPATIENT)
Dept: FAMILY MEDICINE | Facility: CLINIC | Age: 65
End: 2025-06-19

## 2025-06-19 VITALS
HEIGHT: 62 IN | WEIGHT: 178.38 LBS | TEMPERATURE: 97 F | SYSTOLIC BLOOD PRESSURE: 120 MMHG | DIASTOLIC BLOOD PRESSURE: 80 MMHG | BODY MASS INDEX: 32.82 KG/M2 | HEART RATE: 68 BPM | OXYGEN SATURATION: 96 %

## 2025-06-19 DIAGNOSIS — Z86.0100 HISTORY OF COLONIC POLYPS: ICD-10-CM

## 2025-06-19 DIAGNOSIS — Z12.11 SCREENING FOR COLON CANCER: ICD-10-CM

## 2025-06-19 DIAGNOSIS — R07.89 RIGHT-SIDED CHEST WALL PAIN: Primary | ICD-10-CM

## 2025-06-19 PROCEDURE — 99214 OFFICE O/P EST MOD 30 MIN: CPT | Mod: ,,, | Performed by: FAMILY MEDICINE

## 2025-06-19 RX ORDER — DICLOFENAC SODIUM 50 MG/1
50 TABLET, DELAYED RELEASE ORAL 3 TIMES DAILY
Qty: 30 TABLET | Refills: 0 | Status: SHIPPED | OUTPATIENT
Start: 2025-06-19 | End: 2025-06-29

## 2025-06-19 NOTE — PROGRESS NOTES
"SUBJECTIVE:  HPI    Gill Serrano is a 64 y.o. female here for Flank Pain (Right side).   History of Present Illness    CHIEF COMPLAINT:  Patient presents today with right-sided pain after weed eating two weeks ago.    RIGHT-SIDED PAIN:  She reports right-sided pain that originated on the day of weed eating two weeks ago. The pain is described as a dull ache that is not constant but noticeable with movement. The pain affects her walking, causing difficulty with ambulation. She notes the pain has slightly improved since onset. Pain is exacerbated with standing, turning, and getting up after lying down. She denies feeling or hearing a pop. The pain is localized to the right side and does not radiate. She reports no significant worsening of symptoms over the past two weeks. She tried [employer] for symptom relief with no improvement.  No hematuria or dysuria.    Was scheduled to see a nurse practitioner to get a colonoscopy scheduled and she would rather just go in for the procedure.  She has a history of colon polyps in April 2022 and is due for repeat colonoscopy this year.           Gill's allergies, medications, history, and problem list were updated as appropriate.    ROS:  Pertinent ROS as above, otherwise negative    OBJECTIVE:  Vital signs  Visit Vitals  /80 (BP Location: Left arm, Patient Position: Sitting)   Pulse 68   Temp 97 °F (36.1 °C) (Temporal)   Ht 5' 2.01" (1.575 m)   Wt 80.9 kg (178 lb 6.4 oz)   SpO2 96%   BMI 32.62 kg/m²          PHYSICAL EXAM:  General:  Awake, alert, no acute distress   Eyes:  Pupils equal, round, reactive to light.  Conjunctiva normal bilaterally.  Cardiovascular: Regular rate and rhythm.  No murmurs.  Respiratory: Clear to auscultation bilaterally, normal effort  Chest:  Fading 2 cm bruise in the right flank region in the area of her pain with tenderness to palpation over the 8th through the 10th ribs at the posterior axillary line on the right  Skin: No " rashes but bruise as above    Two-view chest x-ray and right-sided rib x-rays, my interpretation prior to radiology report:  Chest x-ray is unremarkable except for some degenerative changes of the thoracic spine.  No pulmonary disease.  There is a questionable area of cortical disruption of the right 8th rib consistent with possible rib fracture that is best seen on the rib series.  But again, this is questionable.    ASSESSMENT/PLAN:  1. Right-sided chest wall pain  Assessment & Plan:  I really suspect that she has a right-sided 8th rib fracture    Diclofenac 50 mg q.8 hours for 10 days     Ice, heat, Ace wrap splinting     If symptoms persist beyond another 2-3 weeks, further imaging may be necessary    Orders:  -     X-Ray Chest PA And Lateral; Future; Expected date: 06/19/2025  -     X-Ray Ribs 2 View Right; Future; Expected date: 06/19/2025  -     diclofenac (VOLTAREN) 50 MG EC tablet; Take 1 tablet (50 mg total) by mouth 3 (three) times daily. for 10 days  Dispense: 30 tablet; Refill: 0    2. Personal history of colonic polyps  Overview:  April 13, 2022:  Polyps in the ascending colon (tubular adenoma), transverse colon (hyperplastic polyp), sigmoid colon (hyperplastic polyps), rectum (tubular adenoma).  Repeat colonoscopy 3 years.      Orders:  -     Ambulatory referral/consult to Gastroenterology; Future; Expected date: 06/26/2025    3. Screening for colon cancer  -     Ambulatory referral/consult to Gastroenterology; Future; Expected date: 06/26/2025            Follow Up:  As scheduled, sooner if needed        This note was generated with the assistance of ambient listening technology. Verbal consent was obtained by the patient and accompanying visitor(s) for the recording of patient appointment to facilitate this note. I attest to having reviewed and edited the generated note for accuracy, though some syntax or spelling errors may persist. Please contact the author of this note for any clarification.

## 2025-06-19 NOTE — ASSESSMENT & PLAN NOTE
I really suspect that she has a right-sided 8th rib fracture    Diclofenac 50 mg q.8 hours for 10 days     Ice, heat, Ace wrap splinting     If symptoms persist beyond another 2-3 weeks, further imaging may be necessary

## 2025-06-24 ENCOUNTER — TELEPHONE (OUTPATIENT)
Dept: GASTROENTEROLOGY | Facility: CLINIC | Age: 65
End: 2025-06-24
Payer: MEDICARE

## 2025-08-21 ENCOUNTER — TELEPHONE (OUTPATIENT)
Dept: OBSTETRICS AND GYNECOLOGY | Facility: CLINIC | Age: 65
End: 2025-08-21
Payer: MEDICARE

## 2025-08-21 DIAGNOSIS — Z12.31 BREAST CANCER SCREENING BY MAMMOGRAM: Primary | ICD-10-CM

## 2025-08-27 ENCOUNTER — HOSPITAL ENCOUNTER (OUTPATIENT)
Dept: RADIOLOGY | Facility: HOSPITAL | Age: 65
Discharge: HOME | End: 2025-08-27
Attending: NURSE PRACTITIONER
Payer: MEDICARE

## 2025-08-27 DIAGNOSIS — Z12.31 BREAST CANCER SCREENING BY MAMMOGRAM: ICD-10-CM

## 2025-08-27 PROCEDURE — 77063 BREAST TOMOSYNTHESIS BI: CPT | Mod: 26,,, | Performed by: STUDENT IN AN ORGANIZED HEALTH CARE EDUCATION/TRAINING PROGRAM

## 2025-08-27 PROCEDURE — 77063 BREAST TOMOSYNTHESIS BI: CPT | Mod: TC

## 2025-08-27 PROCEDURE — 77067 SCR MAMMO BI INCL CAD: CPT | Mod: 26,,, | Performed by: STUDENT IN AN ORGANIZED HEALTH CARE EDUCATION/TRAINING PROGRAM
